# Patient Record
Sex: MALE | Race: WHITE | NOT HISPANIC OR LATINO | URBAN - METROPOLITAN AREA
[De-identification: names, ages, dates, MRNs, and addresses within clinical notes are randomized per-mention and may not be internally consistent; named-entity substitution may affect disease eponyms.]

---

## 2024-06-23 ENCOUNTER — HOSPITAL ENCOUNTER (EMERGENCY)
Facility: HOSPITAL | Age: 39
Discharge: HOME | End: 2024-06-23
Attending: EMERGENCY MEDICINE
Payer: MEDICAID

## 2024-06-23 VITALS
DIASTOLIC BLOOD PRESSURE: 81 MMHG | TEMPERATURE: 98 F | HEART RATE: 85 BPM | RESPIRATION RATE: 19 BRPM | OXYGEN SATURATION: 98 % | SYSTOLIC BLOOD PRESSURE: 149 MMHG

## 2024-06-23 DIAGNOSIS — S93.601A FOOT SPRAIN, RIGHT, INITIAL ENCOUNTER: Primary | ICD-10-CM

## 2024-06-23 DIAGNOSIS — M25.579 ANKLE PAIN: ICD-10-CM

## 2024-06-23 DIAGNOSIS — M79.673 HEEL PAIN: ICD-10-CM

## 2024-06-23 DIAGNOSIS — M79.673 FOOT PAIN: ICD-10-CM

## 2024-06-23 PROCEDURE — 99284 EMERGENCY DEPT VISIT MOD MDM: CPT | Mod: 25

## 2024-06-23 NOTE — ED PROVIDER NOTES
Encounter Date: 6/23/2024       History     Chief Complaint   Patient presents with    Ankle Pain     Reports ankle pain and swelling after [place] with [place] at [place] x 1 week ago. All symptoms have increased over the week. Difficult to bear weight.      39-year-old male with complaint of right ankle and heel pain for 1 week.  Patient reports he jumped off a rope swing and landed on feet.  Patient reports he did not twist his ankle.  Patient reports pain in heel.  Patient denies any back pain.  Patient denies any other complaints.        Review of patient's allergies indicates:  No Known Allergies  No past medical history on file.  No past surgical history on file.  No family history on file.     Review of Systems   Constitutional:  Negative for fever.   HENT:  Negative for sore throat.    Respiratory:  Negative for shortness of breath.    Cardiovascular:  Negative for chest pain.   Gastrointestinal:  Negative for nausea.   Genitourinary:  Negative for dysuria.   Musculoskeletal:  Negative for back pain.        Foot and ankle pain    Skin:  Negative for rash.   Neurological:  Negative for weakness.   Hematological:  Does not bruise/bleed easily.       Physical Exam     Initial Vitals [06/23/24 1115]   BP Pulse Resp Temp SpO2   (!) 149/81 85 19 98.4 °F (36.9 °C) 98 %      MAP       --         Physical Exam    Nursing note and vitals reviewed.  Constitutional: He appears well-developed and well-nourished.   HENT:   Head: Normocephalic and atraumatic.   Eyes: Conjunctivae are normal. Pupils are equal, round, and reactive to light.   Neck: Neck supple.   Normal range of motion.  Cardiovascular:  Normal rate, regular rhythm, normal heart sounds and intact distal pulses.           Pulmonary/Chest: Breath sounds normal.   Abdominal: Abdomen is soft. There is no rebound and no guarding.   Musculoskeletal:         General: Normal range of motion.      Cervical back: Normal range of motion and neck supple.       Comments: Large amount of swelling right foot, 2+ right dorsalis pedis pulse, no ankle tenderness, right posterior heel tenderness,     Neurological: He is alert.   Skin: Skin is warm and dry.   Psychiatric: He has a normal mood and affect. His behavior is normal. Thought content normal.         ED Course   Procedures  Labs Reviewed - No data to display       Imaging Results              CT Foot Without Contrast Right (Final result)  Result time 06/23/24 13:51:57      Final result by Raheel Marcus MD (Timothy) (06/23/24 13:51:57)                   Impression:      No acute bony abnormality.  Dorsal soft tissue swelling involving the forefoot.      Electronically signed by: Raheel Marcus MD  Date:    06/23/2024  Time:    13:51               Narrative:    EXAMINATION:  CT FOOT WITHOUT CONTRAST RIGHT    CLINICAL HISTORY:  , right foot pain fracture    TECHNIQUE:  Standard CT technique.  All CT scans at this facility are performed  using dose modulation techniques as appropriate to performed exam including the following:  automated exposure control; adjustment of mA and/or kV according to the patients size (this includes techniques or standardized protocols for targeted exams where dose is matched to indication/reason for exam: i.e. extremities or head);  iterative reconstruction technique.    COMPARISON:  Right foot 06/23/2024    FINDINGS:  No fractures or dislocations.  There does appear to be dorsal soft tissue swelling present.                                       X-Ray Ankle Complete Right (Final result)  Result time 06/23/24 12:25:50      Final result by Raheel Marcus MD (Timothy) (06/23/24 12:25:50)                   Impression:      No acute fracture or dislocation.      Electronically signed by: Raheel Marcus MD  Date:    06/23/2024  Time:    12:25               Narrative:    EXAMINATION:  XR ANKLE COMPLETE 3 VIEW RIGHT    CLINICAL HISTORY:  Pain in unspecified ankle and joints of unspecified  foot    TECHNIQUE:  Standard radiography performed.  Three views    COMPARISON:  None    FINDINGS:  Bone density and architecture are normal.  No acute findings.  Soft tissue swelling is present                                       X-Ray Foot Complete Right (Final result)  Result time 06/23/24 12:25:23      Final result by Raheel Marcus MD (Timothy) (06/23/24 12:25:23)                   Impression:      Negative exam.      Electronically signed by: Raheel Marcus MD  Date:    06/23/2024  Time:    12:25               Narrative:    EXAMINATION:  XR FOOT COMPLETE 3 VIEW RIGHT    CLINICAL HISTORY:  Pain in unspecified foot    TECHNIQUE:  Standard radiography performed.  Three views.    COMPARISON:  None    FINDINGS:  Bone density and architecture are normal.  No acute findings.                                       X-Ray Calcaneus 2 View Right (Final result)  Result time 06/23/24 12:24:36      Final result by Raheel Marcus MD (Timothy) (06/23/24 12:24:36)                   Impression:      Negative exam.      Electronically signed by: Raheel Marcus MD  Date:    06/23/2024  Time:    12:24               Narrative:    EXAMINATION:  XR CALCANEUS 2 VIEW RIGHT    CLINICAL HISTORY:  Pain in unspecified foot    TECHNIQUE:  Standard radiography performed.  Two views.    COMPARISON:  None    FINDINGS:  Bone density and architecture are normal.  No acute findings.                                       Imaging Results              CT Foot Without Contrast Right (Final result)  Result time 06/23/24 13:51:57      Final result by Raheel Marcus MD (Timothy) (06/23/24 13:51:57)                   Impression:      No acute bony abnormality.  Dorsal soft tissue swelling involving the forefoot.      Electronically signed by: Raheel Marcus MD  Date:    06/23/2024  Time:    13:51               Narrative:    EXAMINATION:  CT FOOT WITHOUT CONTRAST RIGHT    CLINICAL HISTORY:  , right foot pain fracture    TECHNIQUE:  Standard CT  technique.  All CT scans at this facility are performed  using dose modulation techniques as appropriate to performed exam including the following:  automated exposure control; adjustment of mA and/or kV according to the patients size (this includes techniques or standardized protocols for targeted exams where dose is matched to indication/reason for exam: i.e. extremities or head);  iterative reconstruction technique.    COMPARISON:  Right foot 06/23/2024    FINDINGS:  No fractures or dislocations.  There does appear to be dorsal soft tissue swelling present.                                       X-Ray Ankle Complete Right (Final result)  Result time 06/23/24 12:25:50      Final result by Raheel Marcus MD (Timothy) (06/23/24 12:25:50)                   Impression:      No acute fracture or dislocation.      Electronically signed by: Raheel Marcus MD  Date:    06/23/2024  Time:    12:25               Narrative:    EXAMINATION:  XR ANKLE COMPLETE 3 VIEW RIGHT    CLINICAL HISTORY:  Pain in unspecified ankle and joints of unspecified foot    TECHNIQUE:  Standard radiography performed.  Three views    COMPARISON:  None    FINDINGS:  Bone density and architecture are normal.  No acute findings.  Soft tissue swelling is present                                       X-Ray Foot Complete Right (Final result)  Result time 06/23/24 12:25:23      Final result by Raheel Marcus MD (Timothy) (06/23/24 12:25:23)                   Impression:      Negative exam.      Electronically signed by: Raheel Marcus MD  Date:    06/23/2024  Time:    12:25               Narrative:    EXAMINATION:  XR FOOT COMPLETE 3 VIEW RIGHT    CLINICAL HISTORY:  Pain in unspecified foot    TECHNIQUE:  Standard radiography performed.  Three views.    COMPARISON:  None    FINDINGS:  Bone density and architecture are normal.  No acute findings.                                       X-Ray Calcaneus 2 View Right (Final result)  Result time 06/23/24  12:24:36      Final result by Raheel Marcus MD (Timothy) (06/23/24 12:24:36)                   Impression:      Negative exam.      Electronically signed by: Raheel Marcus MD  Date:    06/23/2024  Time:    12:24               Narrative:    EXAMINATION:  XR CALCANEUS 2 VIEW RIGHT    CLINICAL HISTORY:  Pain in unspecified foot    TECHNIQUE:  Standard radiography performed.  Two views.    COMPARISON:  None    FINDINGS:  Bone density and architecture are normal.  No acute findings.                                      Medications - No data to display  Medical Decision Making  Amount and/or Complexity of Data Reviewed  Radiology: ordered.                                      Clinical Impression:  Final diagnoses:  [M79.673] Heel pain  [M79.673] Foot pain  [M25.579] Ankle pain  [S93.601A] Foot sprain, right, initial encounter (Primary)          ED Disposition Condition    Discharge Stable          ED Prescriptions    None       Follow-up Information       Follow up With Specialties Details Why Contact Info    [facility]inic, [facility]  Schedule an appointment as soon as possible for a visit in 2 days  [address] [place] [address] [address]  [facility] [address]  [phone]               Missael Wilson, SOFIYA  06/23/24 1772